# Patient Record
Sex: MALE | Race: WHITE | NOT HISPANIC OR LATINO | Employment: FULL TIME | RURAL
[De-identification: names, ages, dates, MRNs, and addresses within clinical notes are randomized per-mention and may not be internally consistent; named-entity substitution may affect disease eponyms.]

---

## 2024-03-06 ENCOUNTER — TELEPHONE (OUTPATIENT)
Dept: FAMILY MEDICINE | Facility: CLINIC | Age: 29
End: 2024-03-06
Payer: COMMERCIAL

## 2024-03-06 ENCOUNTER — OFFICE VISIT (OUTPATIENT)
Dept: FAMILY MEDICINE | Facility: CLINIC | Age: 29
End: 2024-03-06
Payer: COMMERCIAL

## 2024-03-06 VITALS
OXYGEN SATURATION: 97 % | DIASTOLIC BLOOD PRESSURE: 79 MMHG | HEIGHT: 75 IN | BODY MASS INDEX: 39.17 KG/M2 | WEIGHT: 315 LBS | TEMPERATURE: 98 F | SYSTOLIC BLOOD PRESSURE: 131 MMHG | HEART RATE: 80 BPM

## 2024-03-06 DIAGNOSIS — M25.562 CHRONIC PAIN OF LEFT KNEE: Primary | ICD-10-CM

## 2024-03-06 DIAGNOSIS — G89.29 CHRONIC PAIN OF LEFT KNEE: Primary | ICD-10-CM

## 2024-03-06 PROCEDURE — 99203 OFFICE O/P NEW LOW 30 MIN: CPT | Mod: ,,, | Performed by: STUDENT IN AN ORGANIZED HEALTH CARE EDUCATION/TRAINING PROGRAM

## 2024-03-06 RX ORDER — DICLOFENAC SODIUM 50 MG/1
50 TABLET, DELAYED RELEASE ORAL 2 TIMES DAILY PRN
Qty: 60 TABLET | Refills: 1 | Status: SHIPPED | OUTPATIENT
Start: 2024-03-06

## 2024-03-06 NOTE — TELEPHONE ENCOUNTER
----- Message from Didi Wells MD sent at 3/6/2024  1:15 PM CST -----  Please call the patient with the attached results. X-ray of knee points to problems with ligaments surrounding patella (or kneecap) as well as an old injury on the edge of his tibia. PT will definitely help strengthen the muscles and ligaments around the kneecap. He can also get a knee brace to help stabilize his knee while he is hurting like this. RTC in 1 month if symptoms persist so we can get MRI imaging/possible referral then.

## 2024-03-06 NOTE — PROGRESS NOTES
Subjective:      Dameon Kee is a 28 y.o.male who presents to clinic for Knee Injury (Left knee)      Patient presents to clinic with left knee pain that has been ongoing since 2015.     He played football in school and initially hurt his left knee after another player hit the lateral part of his knee with his shoulder. He did not get it evaluated because he wanted to be able to keep playing. Pain is located on the medial part of his patella. Unable to rate his pain. Symptoms usually occur every 3 months but has been progressively worsening. This episode started a few days ago and his usual methods of pain relief have not been working. He has been taking 800mg ibuprofen and 1g tylenol and alternating. He has also been applying heat therapy and trying to massage his knee and that hasn't worked. Denies any new inciting injuries/trauma. Denies neuropathy. He is concerned about cost and does not want to see Ortho just yet.       Past Medical History:  has no past medical history on file.   Past Surgical History:  has a past surgical history that includes Tonsillectomy.  Family History: family history is not on file.  Social History:  reports that he has quit smoking. His smoking use included cigarettes. He has quit using smokeless tobacco.  His smokeless tobacco use included chew. He reports that he does not use drugs.  Allergies: Review of patient's allergies indicates:  No Known Allergies    Objective:     Vitals:    03/06/24 1045   BP: 131/79   Pulse: 80   Temp: 98.1 °F (36.7 °C)     Physical Exam  Vitals reviewed.   Constitutional:       General: He is not in acute distress.     Appearance: Normal appearance. He is not ill-appearing.   HENT:      Head: Normocephalic and atraumatic.      Right Ear: External ear normal.      Left Ear: External ear normal.   Eyes:      General: No scleral icterus.        Right eye: No discharge.         Left eye: No discharge.      Conjunctiva/sclera: Conjunctivae normal.    Cardiovascular:      Rate and Rhythm: Normal rate and regular rhythm.      Pulses: Normal pulses.   Pulmonary:      Effort: Pulmonary effort is normal. No respiratory distress.      Breath sounds: Normal breath sounds. No wheezing.   Musculoskeletal:      Right knee: Normal.      Instability Tests: Anterior drawer test negative. Posterior drawer test negative.      Left knee: Swelling present. No deformity, lacerations or crepitus. Decreased range of motion. Tenderness present over the medial joint line.      Instability Tests: Anterior drawer test negative. Posterior drawer test negative.      Right lower leg: No edema.      Left lower leg: No edema.      Comments: Unable to do provacative examination on knee due to pain   Neurological:      General: No focal deficit present.      Mental Status: He is alert.   Psychiatric:         Behavior: Behavior normal.              Assessment/Plan:     1. Chronic pain of left knee  - proceed with conservative management as he does not wish to be referred to Ortho at this time  - further evaluate with imaging and refer to PT for strengthening  - cont other home remedies such as heat/ice therapy and stretches  - X-Ray Knee 1 or 2 View Left; Future  - diclofenac (VOLTAREN) 50 MG EC tablet; Take 1 tablet (50 mg total) by mouth 2 (two) times daily as needed (knee pain).  Dispense: 60 tablet; Refill: 1  - Ambulatory referral/consult to Physical/Occupational Therapy; Future        Return to clinic if symptoms persist/worsen and as needed.     Didi Wells MD  Family Medicine  03/06/2024